# Patient Record
Sex: FEMALE | Race: ASIAN | NOT HISPANIC OR LATINO | URBAN - METROPOLITAN AREA
[De-identification: names, ages, dates, MRNs, and addresses within clinical notes are randomized per-mention and may not be internally consistent; named-entity substitution may affect disease eponyms.]

---
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
## 2023-04-13 NOTE — ED PROVIDER NOTE - DOMESTIC TRAVEL HIGH RISK QUESTION
No Detail Level: Detailed Cpt Desired: 98341 Showing: fungal hyphal elements: positive Koh Intro Text (From The.....): A KOH prep was ordered and evaluated from the Koh Procedure Text (Tissue Harvesting Technique): A 15-blade scalpel was used to scrape the skin. The skin scrapings were placed on a glass slide, covered with a coverslip and a KOH solution was applied.

## 2023-08-16 ENCOUNTER — OFFICE (OUTPATIENT)
Dept: URBAN - METROPOLITAN AREA CLINIC 6 | Facility: CLINIC | Age: 18
Setting detail: OPHTHALMOLOGY
End: 2023-08-16
Payer: MEDICAID

## 2023-08-16 DIAGNOSIS — H50.15: ICD-10-CM

## 2023-08-16 DIAGNOSIS — H52.7: ICD-10-CM

## 2023-08-16 PROCEDURE — 92004 COMPRE OPH EXAM NEW PT 1/>: CPT | Performed by: OPHTHALMOLOGY

## 2023-08-16 PROCEDURE — 92015 DETERMINE REFRACTIVE STATE: CPT | Performed by: OPHTHALMOLOGY

## 2023-08-16 ASSESSMENT — REFRACTION_AUTOREFRACTION
OS_CYLINDER: -1.00
OD_SPHERE: -1.75
OS_AXIS: 005
OS_SPHERE: -1.25
OS_SPHERE: -0.75
OS_CYLINDER: -1.00
OD_CYLINDER: -0.50
OD_CYLINDER: -0.75
OD_AXIS: 180
OD_SPHERE: -2.25
OS_AXIS: 5
OD_AXIS: 005

## 2023-08-16 ASSESSMENT — REFRACTION_MANIFEST
OS_VA1: 20/20-1
OD_CYLINDER: -0.50
OS_AXIS: 005
OD_VA1: 20/20
OS_SPHERE: -0.75
OS_AXIS: 005
OU_VA: 20/20-1
OS_VA1: 20/20-1
OD_SPHERE: -2.25
OD_AXIS: 005
OD_VA1: 20/20
OD_CYLINDER: -0.75
OS_CYLINDER: -1.00
OS_CYLINDER: -1.00
OD_AXIS: 180
OS_SPHERE: -0.75
OU_VA: 20/20-1
OD_SPHERE: -2.00

## 2023-08-16 ASSESSMENT — KERATOMETRY
OD_K2POWER_DIOPTERS: 44.75
OS_K2POWER_DIOPTERS: 45.00
OD_K1POWER_DIOPTERS: 43.50
OD_AXISANGLE_DEGREES: 088
OS_K1POWER_DIOPTERS: 43.50
OS_AXISANGLE_DEGREES: 097

## 2023-08-16 ASSESSMENT — AXIALLENGTH_DERIVED
OD_AL: 24.3088
OS_AL: 23.8053
OD_AL: 24.2058
OS_AL: 24.0054
OD_AL: 24.3606
OS_AL: 23.8053
OS_AL: 23.8053
OD_AL: 24.3606

## 2023-08-16 ASSESSMENT — VISUAL ACUITY
OD_BCVA: 20/20
OS_BCVA: 20/20-2

## 2023-08-16 ASSESSMENT — SPHEQUIV_DERIVED
OS_SPHEQUIV: -1.75
OS_SPHEQUIV: -1.25
OD_SPHEQUIV: -2.5
OD_SPHEQUIV: -2.375
OD_SPHEQUIV: -2.5
OD_SPHEQUIV: -2.125

## 2023-08-16 ASSESSMENT — CONFRONTATIONAL VISUAL FIELD TEST (CVF)
OD_FINDINGS: FULL
OS_FINDINGS: FULL

## 2023-08-16 ASSESSMENT — TONOMETRY
OS_IOP_MMHG: 20
OD_IOP_MMHG: 19

## 2023-08-16 ASSESSMENT — REFRACTION_CURRENTRX
OS_CYLINDER: 0.00
OS_AXIS: 0
OD_OVR_VA: 20/
OD_CYLINDER: -0.50
OD_VPRISM_DIRECTION: SV
OD_SPHERE: -1.75
OS_OVR_VA: 20/
OS_SPHERE: -1.00
OS_VPRISM_DIRECTION: SV
OD_AXIS: 175

## 2023-10-08 NOTE — BH PSYCHOLOGY - CLINICIAN PSYCHOTHERAPY NOTE - NSTXSUICIDDATETRGT_PSY_ALL_CORE
19-Aug-2021
19-Aug-2021
patient critically ill requiring medications with intensive monitoring, discussion with consultants, discussion with patient and family, interpretation of diagnostic studies.  I, Maru Krishna, have personally seen and examined this patient. I have fully participated in the care of this patient. I have reviewed all pertinent clinical information, including history, physical exam, plan and the Resident's note and agree except as noted below.     32yo M with insulin dependent DM presenting with elevated sugars, abd pain, nausea/vomiting x1 day. on arrival tachycardic/tachypneic. speaking in full sentences. lungs clear. patient in acute DKA with significant acidosis. HCO 1amp given. insulin gtt with potassium repletion given. abd soft and nontender no need for CT imaging at this time. MICU admission

## 2023-10-19 ENCOUNTER — OFFICE (OUTPATIENT)
Dept: URBAN - METROPOLITAN AREA CLINIC 6 | Facility: CLINIC | Age: 18
Setting detail: OPHTHALMOLOGY
End: 2023-10-19
Payer: MEDICAID

## 2023-10-19 DIAGNOSIS — H52.7: ICD-10-CM

## 2023-10-19 DIAGNOSIS — H50.15: ICD-10-CM

## 2023-10-19 PROCEDURE — 92015 DETERMINE REFRACTIVE STATE: CPT | Performed by: OPHTHALMOLOGY

## 2023-10-19 PROCEDURE — 92060 SENSORIMOTOR EXAMINATION: CPT | Performed by: OPHTHALMOLOGY

## 2023-10-19 PROCEDURE — 99214 OFFICE O/P EST MOD 30 MIN: CPT | Performed by: OPHTHALMOLOGY

## 2023-10-19 ASSESSMENT — SPHEQUIV_DERIVED
OS_SPHEQUIV: -1.25
OD_SPHEQUIV: -2.5
OD_SPHEQUIV: -2.5
OS_SPHEQUIV: -1.25
OD_SPHEQUIV: -2.5
OS_SPHEQUIV: -1.75
OD_SPHEQUIV: -2.125
OS_SPHEQUIV: -1.25

## 2023-10-19 ASSESSMENT — REFRACTION_MANIFEST
OD_SPHERE: -2.25
OD_VA1: 20/20
OS_AXIS: 005
OS_CYLINDER: -1.00
OU_VA: 20/20-1
OS_HPRISM: 1
OD_CYLINDER: -0.50
OS_VPRISM_DIRECTION: BI
OS_VA1: 20/20
OD_HPRISM: 1
OD_AXIS: 005
OD_CYLINDER: -0.50
OS_SPHERE: -0.75
OS_CYLINDER: -1.00
OD_VA1: 20/20-2
OS_AXIS: 005
OS_SPHERE: -0.75
OU_VA: 20/20
OD_VPRISM_DIRECTION: BI
OD_AXIS: 005
OD_SPHERE: -2.25
OS_VA1: 20/20-1

## 2023-10-19 ASSESSMENT — VISUAL ACUITY
OD_BCVA: 20/20
OS_BCVA: 20/20-2

## 2023-10-19 ASSESSMENT — REFRACTION_AUTOREFRACTION
OD_SPHERE: -2.25
OD_SPHERE: -1.75
OD_CYLINDER: -0.75
OS_SPHERE: -1.25
OS_AXIS: 5
OS_AXIS: 005
OS_SPHERE: -0.75
OS_CYLINDER: -1.00
OD_AXIS: 005
OS_CYLINDER: -1.00
OD_CYLINDER: -0.50
OD_AXIS: 180

## 2023-10-19 ASSESSMENT — KERATOMETRY
OD_AXISANGLE_DEGREES: 088
OS_K1POWER_DIOPTERS: 43.50
OS_K2POWER_DIOPTERS: 45.00
OS_AXISANGLE_DEGREES: 097
OD_K1POWER_DIOPTERS: 43.50
OD_K2POWER_DIOPTERS: 44.75

## 2023-10-19 ASSESSMENT — AXIALLENGTH_DERIVED
OS_AL: 23.8053
OD_AL: 24.3606
OD_AL: 24.3606
OS_AL: 23.8053
OD_AL: 24.3606
OD_AL: 24.2058
OS_AL: 23.8053
OS_AL: 24.0054

## 2023-10-19 ASSESSMENT — REFRACTION_CURRENTRX
OS_AXIS: 0
OD_AXIS: 175
OS_OVR_VA: 20/
OD_VPRISM_DIRECTION: SV
OS_CYLINDER: 0.00
OD_OVR_VA: 20/
OS_SPHERE: -1.00
OD_SPHERE: -1.75
OS_VPRISM_DIRECTION: SV
OD_CYLINDER: -0.50

## 2023-10-19 ASSESSMENT — CONFRONTATIONAL VISUAL FIELD TEST (CVF)
OS_FINDINGS: FULL
OD_FINDINGS: FULL

## 2024-04-06 NOTE — BH INPATIENT PSYCHIATRY ASSESSMENT NOTE - NSSUICPROTFACT_PSY_ALL_CORE
0 (no pain/absence of nonverbal indicators of pain)
Identifies reasons for living/Engaged in work or school

## 2024-04-18 ENCOUNTER — OFFICE (OUTPATIENT)
Dept: URBAN - METROPOLITAN AREA CLINIC 6 | Facility: CLINIC | Age: 19
Setting detail: OPHTHALMOLOGY
End: 2024-04-18
Payer: MEDICAID

## 2024-04-18 DIAGNOSIS — H52.13: ICD-10-CM

## 2024-04-18 DIAGNOSIS — H50.15: ICD-10-CM

## 2024-04-18 DIAGNOSIS — H52.7: ICD-10-CM

## 2024-04-18 PROCEDURE — 92015 DETERMINE REFRACTIVE STATE: CPT | Performed by: OPHTHALMOLOGY

## 2024-04-18 PROCEDURE — 99213 OFFICE O/P EST LOW 20 MIN: CPT | Performed by: OPHTHALMOLOGY

## 2024-04-18 PROCEDURE — 92060 SENSORIMOTOR EXAMINATION: CPT | Performed by: OPHTHALMOLOGY

## 2024-10-17 ENCOUNTER — OFFICE (OUTPATIENT)
Dept: URBAN - METROPOLITAN AREA CLINIC 6 | Facility: CLINIC | Age: 19
Setting detail: OPHTHALMOLOGY
End: 2024-10-17
Payer: MEDICAID

## 2024-10-17 DIAGNOSIS — H50.15: ICD-10-CM

## 2024-10-17 PROCEDURE — 92014 COMPRE OPH EXAM EST PT 1/>: CPT | Performed by: OPHTHALMOLOGY

## 2024-10-17 ASSESSMENT — CONFRONTATIONAL VISUAL FIELD TEST (CVF)
OD_FINDINGS: FULL
OS_FINDINGS: FULL

## 2024-10-17 ASSESSMENT — TONOMETRY
OD_IOP_MMHG: 13
OS_IOP_MMHG: 12

## 2024-10-18 ASSESSMENT — REFRACTION_MANIFEST
OD_SPHERE: -2.25
OD_VPRISM_DIRECTION: BI
OD_HPRISM: 5
OU_VA: 20/20
OD_VA1: 20/20
OD_VA1: 20/20-2
OS_SPHERE: -0.75
OS_VA1: 20/20
OD_AXIS: 10
OD_CYLINDER: -0.50
OS_AXIS: 005
OS_CYLINDER: -1.25
OS_HPRISM: 5
OD_CYLINDER: -0.75
OS_VPRISM_DIRECTION: BI
OD_SPHERE: -1.75
OS_VA1: 20/20-1
OD_AXIS: 005
OS_AXIS: 175
OS_CYLINDER: -1.00
OS_SPHERE: -0.75
OU_VA: 20/20-1

## 2024-10-18 ASSESSMENT — REFRACTION_AUTOREFRACTION
OD_AXIS: 180
OD_CYLINDER: -0.75
OS_AXIS: 5
OS_CYLINDER: -1.00
OS_SPHERE: -0.75
OD_SPHERE: -1.75

## 2024-10-18 ASSESSMENT — REFRACTION_CURRENTRX
OD_VPRISM_DIRECTION: SV
OD_OVR_VA: 20/
OS_OVR_VA: 20/
OD_CYLINDER: -0.50
OD_AXIS: 005
OS_AXIS: 179
OS_CYLINDER: -1.00
OD_SPHERE: -2.25
OS_SPHERE: -0.75
OS_VPRISM_DIRECTION: SV

## 2024-10-18 ASSESSMENT — VISUAL ACUITY
OS_BCVA: 20/20-2
OD_BCVA: 20/20-2

## 2024-11-12 NOTE — BH PATIENT PROFILE - NSPROMEDSADMININFO_GEN_A_NUR
Pt hurt his left hand 2 weeks ago and the  looked at it. Pt cannot move middle finger and stating it is causing his whole hand to hurt.    no concerns

## 2025-01-28 NOTE — BH INPATIENT PSYCHIATRY ASSESSMENT NOTE - NSBHMSEAFFRANGE_PSY_A_CORE
[Fully active, able to carry on all pre-disease performance without restriction] : Status 0 - Fully active, able to carry on all pre-disease performance without restriction [Normal] : affect appropriate [de-identified] : bilat augtation Rt wle/RT, no palp [de-identified] : Lt cervical rib Full

## 2025-03-03 ENCOUNTER — EMERGENCY (EMERGENCY)
Facility: HOSPITAL | Age: 20
LOS: 1 days | Discharge: HOME | End: 2025-03-03
Attending: EMERGENCY MEDICINE
Payer: MEDICAID

## 2025-03-03 VITALS
RESPIRATION RATE: 17 BRPM | HEART RATE: 115 BPM | WEIGHT: 125 LBS | HEIGHT: 61 IN | DIASTOLIC BLOOD PRESSURE: 73 MMHG | SYSTOLIC BLOOD PRESSURE: 108 MMHG | OXYGEN SATURATION: 98 % | TEMPERATURE: 98 F

## 2025-03-03 VITALS
RESPIRATION RATE: 18 BRPM | SYSTOLIC BLOOD PRESSURE: 103 MMHG | HEART RATE: 90 BPM | TEMPERATURE: 98 F | DIASTOLIC BLOOD PRESSURE: 57 MMHG | OXYGEN SATURATION: 97 %

## 2025-03-03 LAB
ALBUMIN SERPL ELPH-MCNC: 4 G/DL — SIGNIFICANT CHANGE UP (ref 3.3–5)
ALP SERPL-CCNC: 75 U/L — SIGNIFICANT CHANGE UP (ref 40–120)
ALT FLD-CCNC: 16 U/L — SIGNIFICANT CHANGE UP (ref 10–45)
ANION GAP SERPL CALC-SCNC: 9 MMOL/L — SIGNIFICANT CHANGE UP (ref 5–17)
AST SERPL-CCNC: 19 U/L — SIGNIFICANT CHANGE UP (ref 10–40)
BASOPHILS # BLD AUTO: 0.05 K/UL — SIGNIFICANT CHANGE UP (ref 0–0.2)
BASOPHILS NFR BLD AUTO: 0.5 % — SIGNIFICANT CHANGE UP (ref 0–2)
BILIRUB SERPL-MCNC: 0.3 MG/DL — SIGNIFICANT CHANGE UP (ref 0.2–1.2)
BUN SERPL-MCNC: 10 MG/DL — SIGNIFICANT CHANGE UP (ref 7–23)
CALCIUM SERPL-MCNC: 9.1 MG/DL — SIGNIFICANT CHANGE UP (ref 8.4–10.5)
CHLORIDE SERPL-SCNC: 103 MMOL/L — SIGNIFICANT CHANGE UP (ref 96–108)
CO2 SERPL-SCNC: 25 MMOL/L — SIGNIFICANT CHANGE UP (ref 22–31)
CREAT SERPL-MCNC: 0.5 MG/DL — SIGNIFICANT CHANGE UP (ref 0.5–1.3)
D DIMER BLD IA.RAPID-MCNC: <150 NG/ML DDU — SIGNIFICANT CHANGE UP
EGFR: 137 ML/MIN/1.73M2 — SIGNIFICANT CHANGE UP
EOSINOPHIL # BLD AUTO: 0.23 K/UL — SIGNIFICANT CHANGE UP (ref 0–0.5)
EOSINOPHIL NFR BLD AUTO: 2.4 % — SIGNIFICANT CHANGE UP (ref 0–6)
FLUAV AG NPH QL: SIGNIFICANT CHANGE UP
FLUBV AG NPH QL: SIGNIFICANT CHANGE UP
GAS PNL BLDV: SIGNIFICANT CHANGE UP
GLUCOSE SERPL-MCNC: 123 MG/DL — HIGH (ref 70–99)
HCG SERPL-ACNC: <2 MIU/ML — SIGNIFICANT CHANGE UP
HCT VFR BLD CALC: 37.8 % — SIGNIFICANT CHANGE UP (ref 34.5–45)
HGB BLD-MCNC: 11.7 G/DL — SIGNIFICANT CHANGE UP (ref 11.5–15.5)
HIV 1 & 2 AB SERPL IA.RAPID: SIGNIFICANT CHANGE UP
IMM GRANULOCYTES NFR BLD AUTO: 0.4 % — SIGNIFICANT CHANGE UP (ref 0–0.9)
LYMPHOCYTES # BLD AUTO: 2.08 K/UL — SIGNIFICANT CHANGE UP (ref 1–3.3)
LYMPHOCYTES # BLD AUTO: 21.3 % — SIGNIFICANT CHANGE UP (ref 13–44)
MCHC RBC-ENTMCNC: 24.9 PG — LOW (ref 27–34)
MCHC RBC-ENTMCNC: 31 G/DL — LOW (ref 32–36)
MCV RBC AUTO: 80.4 FL — SIGNIFICANT CHANGE UP (ref 80–100)
MONOCYTES # BLD AUTO: 0.68 K/UL — SIGNIFICANT CHANGE UP (ref 0–0.9)
MONOCYTES NFR BLD AUTO: 7 % — SIGNIFICANT CHANGE UP (ref 2–14)
NEUTROPHILS # BLD AUTO: 6.7 K/UL — SIGNIFICANT CHANGE UP (ref 1.8–7.4)
NEUTROPHILS NFR BLD AUTO: 68.4 % — SIGNIFICANT CHANGE UP (ref 43–77)
NRBC BLD AUTO-RTO: 0 /100 WBCS — SIGNIFICANT CHANGE UP (ref 0–0)
PLATELET # BLD AUTO: 284 K/UL — SIGNIFICANT CHANGE UP (ref 150–400)
POTASSIUM SERPL-MCNC: 4.5 MMOL/L — SIGNIFICANT CHANGE UP (ref 3.5–5.3)
POTASSIUM SERPL-SCNC: 4.5 MMOL/L — SIGNIFICANT CHANGE UP (ref 3.5–5.3)
PROT SERPL-MCNC: 7.9 G/DL — SIGNIFICANT CHANGE UP (ref 6–8.3)
RBC # BLD: 4.7 M/UL — SIGNIFICANT CHANGE UP (ref 3.8–5.2)
RBC # FLD: 15 % — HIGH (ref 10.3–14.5)
RSV RNA NPH QL NAA+NON-PROBE: SIGNIFICANT CHANGE UP
SARS-COV-2 RNA SPEC QL NAA+PROBE: SIGNIFICANT CHANGE UP
SODIUM SERPL-SCNC: 137 MMOL/L — SIGNIFICANT CHANGE UP (ref 135–145)
WBC # BLD: 9.78 K/UL — SIGNIFICANT CHANGE UP (ref 3.8–10.5)
WBC # FLD AUTO: 9.78 K/UL — SIGNIFICANT CHANGE UP (ref 3.8–10.5)

## 2025-03-03 PROCEDURE — 99284 EMERGENCY DEPT VISIT MOD MDM: CPT | Mod: 25

## 2025-03-03 PROCEDURE — 84295 ASSAY OF SERUM SODIUM: CPT

## 2025-03-03 PROCEDURE — 85379 FIBRIN DEGRADATION QUANT: CPT

## 2025-03-03 PROCEDURE — 86703 HIV-1/HIV-2 1 RESULT ANTBDY: CPT

## 2025-03-03 PROCEDURE — 83605 ASSAY OF LACTIC ACID: CPT

## 2025-03-03 PROCEDURE — 85025 COMPLETE CBC W/AUTO DIFF WBC: CPT

## 2025-03-03 PROCEDURE — 82435 ASSAY OF BLOOD CHLORIDE: CPT

## 2025-03-03 PROCEDURE — 71046 X-RAY EXAM CHEST 2 VIEWS: CPT | Mod: 26

## 2025-03-03 PROCEDURE — 82330 ASSAY OF CALCIUM: CPT

## 2025-03-03 PROCEDURE — 80053 COMPREHEN METABOLIC PANEL: CPT

## 2025-03-03 PROCEDURE — 99284 EMERGENCY DEPT VISIT MOD MDM: CPT

## 2025-03-03 PROCEDURE — 87637 SARSCOV2&INF A&B&RSV AMP PRB: CPT

## 2025-03-03 PROCEDURE — 86480 TB TEST CELL IMMUN MEASURE: CPT

## 2025-03-03 PROCEDURE — 82947 ASSAY GLUCOSE BLOOD QUANT: CPT

## 2025-03-03 PROCEDURE — 85014 HEMATOCRIT: CPT

## 2025-03-03 PROCEDURE — 82803 BLOOD GASES ANY COMBINATION: CPT

## 2025-03-03 PROCEDURE — 36000 PLACE NEEDLE IN VEIN: CPT

## 2025-03-03 PROCEDURE — 71046 X-RAY EXAM CHEST 2 VIEWS: CPT

## 2025-03-03 PROCEDURE — 85018 HEMOGLOBIN: CPT

## 2025-03-03 PROCEDURE — 84132 ASSAY OF SERUM POTASSIUM: CPT

## 2025-03-03 PROCEDURE — 84702 CHORIONIC GONADOTROPIN TEST: CPT

## 2025-03-03 RX ORDER — DEXAMETHASONE 0.5 MG/1
6 TABLET ORAL ONCE
Refills: 0 | Status: COMPLETED | OUTPATIENT
Start: 2025-03-03 | End: 2025-03-03

## 2025-03-03 RX ORDER — BENZONATATE 100 MG
100 CAPSULE ORAL ONCE
Refills: 0 | Status: COMPLETED | OUTPATIENT
Start: 2025-03-03 | End: 2025-03-03

## 2025-03-03 RX ORDER — ACETAMINOPHEN 500 MG/5ML
975 LIQUID (ML) ORAL ONCE
Refills: 0 | Status: COMPLETED | OUTPATIENT
Start: 2025-03-03 | End: 2025-03-03

## 2025-03-03 RX ORDER — BENZONATATE 100 MG
1 CAPSULE ORAL
Qty: 15 | Refills: 0
Start: 2025-03-03 | End: 2025-03-07

## 2025-03-03 RX ADMIN — Medication 100 MILLIGRAM(S): at 17:42

## 2025-03-03 RX ADMIN — Medication 1000 MILLILITER(S): at 17:43

## 2025-03-03 RX ADMIN — Medication 975 MILLIGRAM(S): at 17:42

## 2025-03-03 RX ADMIN — DEXAMETHASONE 6 MILLIGRAM(S): 0.5 TABLET ORAL at 17:43

## 2025-03-03 NOTE — ED PROVIDER NOTE - ATTENDING CONTRIBUTION TO CARE
This is a 19-year-old female who just spent 2 weeks in [facility] nose coming in with productive cough subjective fevers chills myalgias and a sore throat.  Do sputum has streaks of blood.  Her partner who is traveling with her is also ill though not as much.  Awake alert talking no acute distress.  Regular rate and rhythm clear lungs nontender abdomen.  No leg edema.  I examined the patient under airborne precautions.  Given the recent travel we will do basic blood work including check her for tuberculosis.  Will also check for PE with a D-dimer as well as flu swab and a chest x-ray.  Patient does not appear toxic and suspect that this is a viral syndrome.  Was transferred to the afternoon doctor at the change of shift pending D-dimer test.

## 2025-03-03 NOTE — ED PROVIDER NOTE - OBJECTIVE STATEMENT
19-year-old otherwise healthy Female who presents with cough productive with streaks of blood for the past 2 days. Reports subjective fever. Recently returned from [place] earlier today. States  has similar symptoms. Denies fever, chills, chest pain, shortness of breath, nausea, vomiting, urinary complaints. 19-year-old otherwise healthy Female who presents with cough productive with streaks of blood for the past 2 days. Reports subjective fever, chills, myalgias, and sore throat. Recently returned from [place] earlier today. States  has similar symptoms. Denies chest pain, shortness of breath, nausea, vomiting, urinary complaints. 19-year-old otherwise healthy Female who presents with cough productive with streaks of blood for the past 2 days. Initially started with cough productive of sputum but later developed streaks of blood. Reports subjective fever, chills, myalgias, and sore throat. Recently returned from [place] earlier today. States  has similar symptoms. Denies chest pain, shortness of breath, nausea, vomiting, urinary complaints. 19-year-old otherwise healthy Female who presents with cough productive with streaks of blood for the past 2 days. Developed streaks of blood in sputum after excessive coughing. Reports subjective fever, chills, myalgias, and sore throat. Recently returned from [place] earlier today. States  has similar symptoms. Denies chest pain, shortness of breath, nausea, vomiting, urinary complaints.

## 2025-03-03 NOTE — ED ADULT NURSE NOTE - OBJECTIVE STATEMENT
19 y.o F A&OX4 w. no significant PMH presents to ED complaining of fever. PT states that she started feeling unwell about 2 days ago with fevers, cough and congestion. Pt states that she recently landed from [place] today and her  is also feeling sick. Pt states that she is coughing up red tinged sputum due to her frequent coughing. Pt states that she feels weak and has been having on and off fevers. Pt is afebrile on initial assessment. Respirations are even and unlabored. Pt is well appearing and in no acute distress

## 2025-03-03 NOTE — ED PROVIDER NOTE - PATIENT PORTAL LINK FT
You can access the FollowMyHealth Patient Portal offered by [facility] by registering at the following website: [email or web address]. By joining [facility]’s FollowMyHealth portal, you will also be able to view your health information using other applications (apps) compatible with our system.

## 2025-03-03 NOTE — ED PROVIDER NOTE - PHYSICAL EXAMINATION
INITIAL VITAL SIGNS: Reviewed by me.  GENERAL: In no acute distress.  HEAD: Normocephalic. Atraumatic.  EYES: EOMI. PERRL.  ENT: Moist mucous membranes. Oropharynx is clear.   NECK: Supple. No meningismus.   CV: Regular rate and rhythm. No murmurs, rubs, or gallops.  RESPIRATORY: Unlabored respirations. Clear to ascultation bilaterally.  ABDOMEN: Soft, non-distended, non-tender. No guarding or rebound.  EXTREMITIES: No deformities. No edema.   SKIN: Warm and dry. No rashes or petechiae.  NEURO: Grossly non-focal.

## 2025-03-03 NOTE — ED PROVIDER NOTE - NSFOLLOWUPINSTRUCTIONS_ED_ALL_ED_FT
Please follow-up with your primary care doctor this week.  Your test results from your ED visit were discussed with you prior to discharge  You were provided with a copy of your test results    Stay hydrated.   Please take Tylenol 650 mg every 6 hours as needed for pain. Please do not exceed more than 4,000 mg of Tylenol in a day  Please take Motrin 600 mg by mouth every 6 hours as needed for pain. Please take this medication with food.    ***SEEK IMMEDIATE MEDICAL CARE IF YOU HAVE ANY OF THE FOLLOWING SYMPTOMS: any new or worsening symptoms such as chest pain, difficulty breathing, shortness of breath, fever for more then 10 days, lightheadedness/dizziness. or any other concerning symptoms***    Viral Respiratory Infection    A viral respiratory infection is an illness that affects parts of the body used for breathing, like the lungs, nose, and throat. It is caused by a germ called a virus. Symptoms can include runny nose, coughing, sneezing, fatigue, body aches, sore throat, fever, or headache. Over the counter medicine can be used to manage the symptoms but the infection typically goes away on its own in 5 to 10 days. Please follow-up with your primary care doctor this week.  Your test results from your ED visit were discussed with you prior to discharge  You were provided with a copy of your test results.   Your TB test is pending. You will be contacted if abnormal     Stay hydrated.   Please take Tylenol 650 mg every 6 hours as needed for pain. Please do not exceed more than 4,000 mg of Tylenol in a day  Please take Motrin 600 mg by mouth every 6 hours as needed for pain. Please take this medication with food.    ***SEEK IMMEDIATE MEDICAL CARE IF YOU HAVE ANY OF THE FOLLOWING SYMPTOMS: any new or worsening symptoms such as chest pain, difficulty breathing, shortness of breath, fever for more then 10 days, lightheadedness/dizziness. or any other concerning symptoms***    Viral Respiratory Infection    A viral respiratory infection is an illness that affects parts of the body used for breathing, like the lungs, nose, and throat. It is caused by a germ called a virus. Symptoms can include runny nose, coughing, sneezing, fatigue, body aches, sore throat, fever, or headache. Over the counter medicine can be used to manage the symptoms but the infection typically goes away on its own in 5 to 10 days. Please follow-up with your primary care doctor this week.  Your test results from your ED visit were discussed with you prior to discharge  You were provided with a copy of your test results.   Your TB test is pending. You will be contacted if abnormal     Stay hydrated.   Please take Tylenol 650 mg every 6 hours as needed for pain. Please do not exceed more than 4,000 mg of Tylenol in a day  Please take Motrin 600 mg by mouth every 6 hours as needed for pain. Please take this medication with food.  You were given a prescription for benzonatate 100mg. Please take 1 tablet every 8 hours as needed for cough.     ***SEEK IMMEDIATE MEDICAL CARE IF YOU HAVE ANY OF THE FOLLOWING SYMPTOMS: any new or worsening symptoms such as chest pain, difficulty breathing, shortness of breath, fever for more then 10 days, lightheadedness/dizziness. or any other concerning symptoms***    Viral Respiratory Infection    A viral respiratory infection is an illness that affects parts of the body used for breathing, like the lungs, nose, and throat. It is caused by a germ called a virus. Symptoms can include runny nose, coughing, sneezing, fatigue, body aches, sore throat, fever, or headache. Over the counter medicine can be used to manage the symptoms but the infection typically goes away on its own in 5 to 10 days.

## 2025-03-03 NOTE — ED PROVIDER NOTE - RAPID ASSESSMENT
Dr. Barriga: Patient was rapidly assessed by me as the Quick Triage Doctor; a limited history, physical exam and assessment was performed. The patient will be seen and further evaluated in the main emergency department. The remainder of care and evaluation will be conducted by the primary emergency medicine team. Receiving team will follow up on labs, imaging and serially reassess patient as indicated. All further decisions regarding patient care, evaluation and disposition are at the discretion of the receiving primary emergency department team.    Dr. Barriga: 19-year-old female returned from [place] today, presenting with 2 days of productive cough with hemoptysis, fever Tmax unknown, and shortness of breath only with coughing.  No pleuritic chest pain, no calf swelling.   with similar symptoms.  No known exposure to tuberculosis but patient is unsure if was around anyone with TB.

## 2025-03-03 NOTE — ED PROVIDER NOTE - PROGRESS NOTE DETAILS
Patient states she has a sore throat she is feeling mildly improved she is is requesting IV fluids she states that she flew on an airplane today.  Patient was counseled that she should sleep in a humidified environment aware we are awaiting TB testing , though pt blood tinged sputum occurred after she was coughing "a lot" On reassessment, the patient is feeling much better.   Results discussed with patient and their family. Recommended taking Tylenol and Ibuprofen for pain. Instructed the importance of following up with their PMD. Strict return precautions given. The patient expressed understanding and feels comfortable being discharged home. Results discussed with patient and their family. Recommended taking Tylenol and Ibuprofen for pain. Prescription given for benzonatate. Instructed the importance of following up with their PMD. Strict return precautions given. The patient expressed understanding and feels comfortable being discharged home.

## 2025-03-03 NOTE — ED PROVIDER NOTE - CLINICAL SUMMARY MEDICAL DECISION MAKING FREE TEXT BOX
19-year-old otherwise healthy Female who presents with cough productive with streaks of blood for the past 2 days. Developed streaks of blood in sputum after excessive coughing. Reports subjective fever, chills, myalgias, and sore throat. Recently returned from [place] [facility] earlier today. Afebrile. VSS. Oropharynx is clear. Likely viral etiology. Labs and CXR drawn in triage. TB ordered

## 2025-03-07 LAB
GAMMA INTERFERON BACKGROUND BLD IA-ACNC: 0.02 IU/ML — SIGNIFICANT CHANGE UP
M TB IFN-G BLD-IMP: NEGATIVE — SIGNIFICANT CHANGE UP
M TB IFN-G CD4+ BCKGRND COR BLD-ACNC: 0 IU/ML — SIGNIFICANT CHANGE UP
M TB IFN-G CD4+CD8+ BCKGRND COR BLD-ACNC: 0 IU/ML — SIGNIFICANT CHANGE UP
QUANT TB PLUS MITOGEN MINUS NIL: 4.6 IU/ML — SIGNIFICANT CHANGE UP